# Patient Record
Sex: FEMALE | Race: WHITE | Employment: OTHER | URBAN - METROPOLITAN AREA
[De-identification: names, ages, dates, MRNs, and addresses within clinical notes are randomized per-mention and may not be internally consistent; named-entity substitution may affect disease eponyms.]

---

## 2017-04-07 ENCOUNTER — OFFICE VISIT (OUTPATIENT)
Dept: OBGYN CLINIC | Facility: CLINIC | Age: 52
End: 2017-04-07

## 2017-04-07 VITALS
DIASTOLIC BLOOD PRESSURE: 80 MMHG | WEIGHT: 150 LBS | HEIGHT: 64.25 IN | SYSTOLIC BLOOD PRESSURE: 129 MMHG | BODY MASS INDEX: 25.61 KG/M2 | HEART RATE: 72 BPM

## 2017-04-07 DIAGNOSIS — Z12.4 SCREENING FOR MALIGNANT NEOPLASM OF CERVIX: ICD-10-CM

## 2017-04-07 DIAGNOSIS — Z01.419 ENCOUNTER FOR GYNECOLOGICAL EXAMINATION: Primary | ICD-10-CM

## 2017-04-07 DIAGNOSIS — N93.0 PCB (POST COITAL BLEEDING): ICD-10-CM

## 2017-04-07 DIAGNOSIS — Z12.31 ENCOUNTER FOR SCREENING MAMMOGRAM FOR BREAST CANCER: ICD-10-CM

## 2017-04-07 PROCEDURE — 99396 PREV VISIT EST AGE 40-64: CPT | Performed by: OBSTETRICS & GYNECOLOGY

## 2017-04-07 RX ORDER — PREDNISONE 20 MG/1
TABLET ORAL
Refills: 0 | COMMUNITY
Start: 2017-02-12 | End: 2017-04-07

## 2017-04-07 RX ORDER — FLUTICASONE PROPIONATE AND SALMETEROL 50; 250 UG/1; UG/1
POWDER RESPIRATORY (INHALATION)
Refills: 10 | COMMUNITY
Start: 2017-02-10 | End: 2017-10-04

## 2017-04-07 NOTE — PROGRESS NOTES
Julien Rubinstein is a 50year old female [id number] Patient's last menstrual period was 09/15/2014. here for annual exam.       Last seen 9/1/15. Last pap 4/2014 normal with neg HPV. LMP 2014. No changes with health. Mild hot flashes.     Had on HOURS AS NEEDED FOR WHEEZING AND BEFORE EXERCISE ACTIVITIES Disp: 3 Inhaler Rfl: 0   Multiple Vitamin (MULTIVITAMINS OR) Take  by mouth. Disp:  Rfl:    BIOTIN OR Take  by mouth.  Disp:  Rfl:    Calcium Carb-Cholecalciferol (CALCIUM 1000 + D OR) Take  by valentín tenderness  Vagina:  Normal appearance without lesions, no abnormal discharge  Cervix:  Normal without tenderness on motion  Uterus: normal in size, contour, position, mobility, without tenderness  Adnexa: normal without masses or tenderness  Perineum/anus

## 2017-04-17 NOTE — PROGRESS NOTES
Quick Note:    Normal pap and negative HPV.  But return to clinic in 1 year for annual.   Negative gc/chlamydia/trich  ______

## 2017-05-09 PROCEDURE — 82607 VITAMIN B-12: CPT | Performed by: INTERNAL MEDICINE

## 2017-05-09 PROCEDURE — 81001 URINALYSIS AUTO W/SCOPE: CPT | Performed by: INTERNAL MEDICINE

## 2017-05-09 PROCEDURE — 82746 ASSAY OF FOLIC ACID SERUM: CPT | Performed by: INTERNAL MEDICINE

## 2017-05-09 PROCEDURE — 84165 PROTEIN E-PHORESIS SERUM: CPT | Performed by: INTERNAL MEDICINE

## 2017-05-09 PROCEDURE — 83883 ASSAY NEPHELOMETRY NOT SPEC: CPT | Performed by: INTERNAL MEDICINE

## 2017-05-09 PROCEDURE — 86334 IMMUNOFIX E-PHORESIS SERUM: CPT | Performed by: INTERNAL MEDICINE

## 2017-05-15 PROCEDURE — 82085 ASSAY OF ALDOLASE: CPT | Performed by: INTERNAL MEDICINE

## 2017-05-30 PROCEDURE — 86618 LYME DISEASE ANTIBODY: CPT | Performed by: INTERNAL MEDICINE

## 2017-05-30 PROCEDURE — 84238 ASSAY NONENDOCRINE RECEPTOR: CPT | Performed by: INTERNAL MEDICINE

## 2017-05-30 PROCEDURE — 83519 RIA NONANTIBODY: CPT | Performed by: INTERNAL MEDICINE

## 2017-05-30 PROCEDURE — 36415 COLL VENOUS BLD VENIPUNCTURE: CPT | Performed by: INTERNAL MEDICINE

## 2017-07-06 PROCEDURE — 83516 IMMUNOASSAY NONANTIBODY: CPT | Performed by: OTHER

## 2017-07-06 PROCEDURE — 86235 NUCLEAR ANTIGEN ANTIBODY: CPT | Performed by: OTHER

## 2017-07-06 PROCEDURE — 36415 COLL VENOUS BLD VENIPUNCTURE: CPT | Performed by: OTHER

## 2017-07-08 PROCEDURE — 84156 ASSAY OF PROTEIN URINE: CPT | Performed by: OTHER

## 2017-07-08 PROCEDURE — 83883 ASSAY NEPHELOMETRY NOT SPEC: CPT | Performed by: OTHER

## 2017-07-08 PROCEDURE — 36415 COLL VENOUS BLD VENIPUNCTURE: CPT | Performed by: OTHER

## 2017-07-08 PROCEDURE — 86335 IMMUNFIX E-PHORSIS/URINE/CSF: CPT | Performed by: OTHER

## 2017-08-03 PROCEDURE — 86658 ENTEROVIRUS ANTIBODY: CPT | Performed by: INTERNAL MEDICINE

## 2017-08-03 PROCEDURE — 86788 WEST NILE VIRUS AB IGM: CPT | Performed by: INTERNAL MEDICINE

## 2017-08-03 PROCEDURE — 82784 ASSAY IGA/IGD/IGG/IGM EACH: CPT | Performed by: INTERNAL MEDICINE

## 2017-08-03 PROCEDURE — 86664 EPSTEIN-BARR NUCLEAR ANTIGEN: CPT | Performed by: INTERNAL MEDICINE

## 2017-08-03 PROCEDURE — 86665 EPSTEIN-BARR CAPSID VCA: CPT | Performed by: INTERNAL MEDICINE

## 2017-08-03 PROCEDURE — 86645 CMV ANTIBODY IGM: CPT | Performed by: INTERNAL MEDICINE

## 2017-08-03 PROCEDURE — 86789 WEST NILE VIRUS ANTIBODY: CPT | Performed by: INTERNAL MEDICINE

## 2017-08-03 PROCEDURE — 86644 CMV ANTIBODY: CPT | Performed by: INTERNAL MEDICINE

## 2017-08-03 PROCEDURE — 86663 EPSTEIN-BARR ANTIBODY: CPT | Performed by: INTERNAL MEDICINE

## 2017-08-03 PROCEDURE — 36415 COLL VENOUS BLD VENIPUNCTURE: CPT | Performed by: INTERNAL MEDICINE

## 2017-08-09 PROCEDURE — 86803 HEPATITIS C AB TEST: CPT | Performed by: INTERNAL MEDICINE

## 2017-08-09 PROCEDURE — 87389 HIV-1 AG W/HIV-1&-2 AB AG IA: CPT | Performed by: INTERNAL MEDICINE

## 2017-08-09 PROCEDURE — 36415 COLL VENOUS BLD VENIPUNCTURE: CPT | Performed by: INTERNAL MEDICINE

## 2017-08-09 PROCEDURE — 82595 ASSAY OF CRYOGLOBULIN: CPT | Performed by: INTERNAL MEDICINE

## 2017-08-09 PROCEDURE — 82787 IGG 1 2 3 OR 4 EACH: CPT | Performed by: INTERNAL MEDICINE

## 2017-08-17 PROCEDURE — 86666 EHRLICHIA ANTIBODY: CPT | Performed by: INTERNAL MEDICINE

## 2017-08-17 PROCEDURE — 87799 DETECT AGENT NOS DNA QUANT: CPT | Performed by: INTERNAL MEDICINE

## 2017-08-17 PROCEDURE — 36415 COLL VENOUS BLD VENIPUNCTURE: CPT | Performed by: INTERNAL MEDICINE

## 2017-08-17 PROCEDURE — 86611 BARTONELLA ANTIBODY: CPT | Performed by: INTERNAL MEDICINE

## 2017-08-17 PROCEDURE — 86753 PROTOZOA ANTIBODY NOS: CPT | Performed by: INTERNAL MEDICINE

## 2017-08-17 PROCEDURE — 86812 HLA TYPING A B OR C: CPT | Performed by: INTERNAL MEDICINE

## 2018-02-12 ENCOUNTER — TELEPHONE (OUTPATIENT)
Dept: PEDIATRICS CLINIC | Facility: CLINIC | Age: 53
End: 2018-02-12

## 2018-02-12 NOTE — TELEPHONE ENCOUNTER
Pt informed she still has an active mammo order in her chart from 4/7/17 that she never had done. Gave her # to call and schedule this. Pt aware it is only valid through 4/7/18.

## 2018-03-04 ENCOUNTER — HOSPITAL ENCOUNTER (OUTPATIENT)
Dept: MAMMOGRAPHY | Facility: HOSPITAL | Age: 53
Discharge: HOME | End: 2018-03-04
Attending: OBSTETRICS & GYNECOLOGY

## 2018-03-04 DIAGNOSIS — Z12.31 ENCOUNTER FOR SCREENING MAMMOGRAM FOR BREAST CANCER: ICD-10-CM

## 2018-03-04 PROCEDURE — 77067 SCR MAMMO BI INCL CAD: CPT | Performed by: OBSTETRICS & GYNECOLOGY

## 2018-03-23 PROCEDURE — 81001 URINALYSIS AUTO W/SCOPE: CPT | Performed by: INTERNAL MEDICINE

## 2018-03-23 PROCEDURE — 87641 MR-STAPH DNA AMP PROBE: CPT | Performed by: INTERNAL MEDICINE

## 2018-03-23 PROCEDURE — 87086 URINE CULTURE/COLONY COUNT: CPT | Performed by: INTERNAL MEDICINE

## 2018-03-29 ENCOUNTER — APPOINTMENT (OUTPATIENT)
Dept: GENERAL RADIOLOGY | Facility: HOSPITAL | Age: 53
End: 2018-03-29
Attending: ORTHOPAEDIC SURGERY
Payer: COMMERCIAL

## 2018-03-29 ENCOUNTER — ANESTHESIA EVENT (OUTPATIENT)
Dept: SURGERY | Facility: HOSPITAL | Age: 53
End: 2018-03-29
Payer: COMMERCIAL

## 2018-03-29 ENCOUNTER — SURGERY (OUTPATIENT)
Age: 53
End: 2018-03-29

## 2018-03-29 ENCOUNTER — ANESTHESIA (OUTPATIENT)
Dept: SURGERY | Facility: HOSPITAL | Age: 53
End: 2018-03-29
Payer: COMMERCIAL

## 2018-03-29 ENCOUNTER — HOSPITAL ENCOUNTER (OUTPATIENT)
Facility: HOSPITAL | Age: 53
Setting detail: HOSPITAL OUTPATIENT SURGERY
Discharge: HOME | End: 2018-03-29
Attending: ORTHOPAEDIC SURGERY | Admitting: ORTHOPAEDIC SURGERY
Payer: COMMERCIAL

## 2018-03-29 VITALS
DIASTOLIC BLOOD PRESSURE: 69 MMHG | HEART RATE: 76 BPM | OXYGEN SATURATION: 98 % | RESPIRATION RATE: 14 BRPM | BODY MASS INDEX: 24.84 KG/M2 | TEMPERATURE: 98 F | WEIGHT: 145.5 LBS | HEIGHT: 64 IN | SYSTOLIC BLOOD PRESSURE: 135 MMHG

## 2018-03-29 DIAGNOSIS — M54.59 INTRACTABLE LOW BACK PAIN: ICD-10-CM

## 2018-03-29 DIAGNOSIS — M51.16 LUMBAR DISC HERNIATION WITH RADICULOPATHY: ICD-10-CM

## 2018-03-29 DIAGNOSIS — Z98.890 S/P LUMBAR MICRODISCECTOMY: Primary | ICD-10-CM

## 2018-03-29 PROCEDURE — 72100 X-RAY EXAM L-S SPINE 2/3 VWS: CPT | Performed by: ORTHOPAEDIC SURGERY

## 2018-03-29 PROCEDURE — 88311 DECALCIFY TISSUE: CPT | Performed by: ORTHOPAEDIC SURGERY

## 2018-03-29 PROCEDURE — 0SB20ZZ EXCISION OF LUMBAR VERTEBRAL DISC, OPEN APPROACH: ICD-10-PCS | Performed by: ORTHOPAEDIC SURGERY

## 2018-03-29 PROCEDURE — 76001 XR C-ARM FLUORO >1 HOUR  (CPT=76001): CPT | Performed by: ORTHOPAEDIC SURGERY

## 2018-03-29 PROCEDURE — 01NB0ZZ RELEASE LUMBAR NERVE, OPEN APPROACH: ICD-10-PCS | Performed by: ORTHOPAEDIC SURGERY

## 2018-03-29 PROCEDURE — 88304 TISSUE EXAM BY PATHOLOGIST: CPT | Performed by: ORTHOPAEDIC SURGERY

## 2018-03-29 RX ORDER — HYDROMORPHONE HYDROCHLORIDE 1 MG/ML
0.2 INJECTION, SOLUTION INTRAMUSCULAR; INTRAVENOUS; SUBCUTANEOUS EVERY 5 MIN PRN
Status: DISCONTINUED | OUTPATIENT
Start: 2018-03-29 | End: 2018-03-29

## 2018-03-29 RX ORDER — HYDROMORPHONE HYDROCHLORIDE 1 MG/ML
0.6 INJECTION, SOLUTION INTRAMUSCULAR; INTRAVENOUS; SUBCUTANEOUS EVERY 5 MIN PRN
Status: DISCONTINUED | OUTPATIENT
Start: 2018-03-29 | End: 2018-03-29

## 2018-03-29 RX ORDER — ROCURONIUM BROMIDE 10 MG/ML
INJECTION, SOLUTION INTRAVENOUS AS NEEDED
Status: DISCONTINUED | OUTPATIENT
Start: 2018-03-29 | End: 2018-03-29 | Stop reason: SURG

## 2018-03-29 RX ORDER — DOCUSATE SODIUM 100 MG/1
100 CAPSULE, LIQUID FILLED ORAL 2 TIMES DAILY
Qty: 60 CAPSULE | Refills: 1 | Status: SHIPPED | OUTPATIENT
Start: 2018-03-29 | End: 2018-04-06 | Stop reason: ALTCHOICE

## 2018-03-29 RX ORDER — FAMOTIDINE 20 MG/1
20 TABLET ORAL ONCE
Status: DISCONTINUED | OUTPATIENT
Start: 2018-03-29 | End: 2018-03-29 | Stop reason: HOSPADM

## 2018-03-29 RX ORDER — HYDROCODONE BITARTRATE AND ACETAMINOPHEN 5; 325 MG/1; MG/1
2 TABLET ORAL AS NEEDED
Status: COMPLETED | OUTPATIENT
Start: 2018-03-29 | End: 2018-03-29

## 2018-03-29 RX ORDER — SCOLOPAMINE TRANSDERMAL SYSTEM 1 MG/1
1 PATCH, EXTENDED RELEASE TRANSDERMAL
Status: DISCONTINUED | OUTPATIENT
Start: 2018-03-29 | End: 2018-04-01 | Stop reason: HOSPADM

## 2018-03-29 RX ORDER — GLYCOPYRROLATE 0.2 MG/ML
INJECTION INTRAMUSCULAR; INTRAVENOUS AS NEEDED
Status: DISCONTINUED | OUTPATIENT
Start: 2018-03-29 | End: 2018-03-29 | Stop reason: SURG

## 2018-03-29 RX ORDER — MORPHINE SULFATE 4 MG/ML
4 INJECTION, SOLUTION INTRAMUSCULAR; INTRAVENOUS EVERY 10 MIN PRN
Status: DISCONTINUED | OUTPATIENT
Start: 2018-03-29 | End: 2018-03-29

## 2018-03-29 RX ORDER — ACETAMINOPHEN 500 MG
1000 TABLET ORAL ONCE
Status: DISCONTINUED | OUTPATIENT
Start: 2018-03-29 | End: 2018-03-29 | Stop reason: HOSPADM

## 2018-03-29 RX ORDER — HYDROMORPHONE HYDROCHLORIDE 1 MG/ML
INJECTION, SOLUTION INTRAMUSCULAR; INTRAVENOUS; SUBCUTANEOUS AS NEEDED
Status: DISCONTINUED | OUTPATIENT
Start: 2018-03-29 | End: 2018-03-29 | Stop reason: SURG

## 2018-03-29 RX ORDER — SODIUM CHLORIDE, SODIUM LACTATE, POTASSIUM CHLORIDE, CALCIUM CHLORIDE 600; 310; 30; 20 MG/100ML; MG/100ML; MG/100ML; MG/100ML
INJECTION, SOLUTION INTRAVENOUS CONTINUOUS
Status: DISCONTINUED | OUTPATIENT
Start: 2018-03-29 | End: 2018-03-29

## 2018-03-29 RX ORDER — DEXAMETHASONE SODIUM PHOSPHATE 4 MG/ML
VIAL (ML) INJECTION AS NEEDED
Status: DISCONTINUED | OUTPATIENT
Start: 2018-03-29 | End: 2018-03-29 | Stop reason: SURG

## 2018-03-29 RX ORDER — NEOSTIGMINE METHYLSULFATE 0.5 MG/ML
INJECTION INTRAVENOUS AS NEEDED
Status: DISCONTINUED | OUTPATIENT
Start: 2018-03-29 | End: 2018-03-29 | Stop reason: SURG

## 2018-03-29 RX ORDER — HALOPERIDOL 5 MG/ML
0.25 INJECTION INTRAMUSCULAR ONCE AS NEEDED
Status: DISCONTINUED | OUTPATIENT
Start: 2018-03-29 | End: 2018-03-29

## 2018-03-29 RX ORDER — TIZANIDINE 4 MG/1
4 TABLET ORAL EVERY 8 HOURS PRN
Qty: 50 TABLET | Refills: 0 | Status: SHIPPED | OUTPATIENT
Start: 2018-03-29 | End: 2018-05-15

## 2018-03-29 RX ORDER — HYDROCODONE BITARTRATE AND ACETAMINOPHEN 5; 325 MG/1; MG/1
1 TABLET ORAL AS NEEDED
Status: COMPLETED | OUTPATIENT
Start: 2018-03-29 | End: 2018-03-29

## 2018-03-29 RX ORDER — ONDANSETRON 2 MG/ML
4 INJECTION INTRAMUSCULAR; INTRAVENOUS ONCE AS NEEDED
Status: DISCONTINUED | OUTPATIENT
Start: 2018-03-29 | End: 2018-03-29

## 2018-03-29 RX ORDER — NALOXONE HYDROCHLORIDE 0.4 MG/ML
80 INJECTION, SOLUTION INTRAMUSCULAR; INTRAVENOUS; SUBCUTANEOUS AS NEEDED
Status: DISCONTINUED | OUTPATIENT
Start: 2018-03-29 | End: 2018-03-29

## 2018-03-29 RX ORDER — SCOLOPAMINE TRANSDERMAL SYSTEM 1 MG/1
1 PATCH, EXTENDED RELEASE TRANSDERMAL ONCE
Status: DISCONTINUED | OUTPATIENT
Start: 2018-03-29 | End: 2018-03-29 | Stop reason: HOSPADM

## 2018-03-29 RX ORDER — MORPHINE SULFATE 10 MG/ML
6 INJECTION, SOLUTION INTRAMUSCULAR; INTRAVENOUS EVERY 10 MIN PRN
Status: DISCONTINUED | OUTPATIENT
Start: 2018-03-29 | End: 2018-03-29

## 2018-03-29 RX ORDER — LIDOCAINE HYDROCHLORIDE 10 MG/ML
INJECTION, SOLUTION EPIDURAL; INFILTRATION; INTRACAUDAL; PERINEURAL AS NEEDED
Status: DISCONTINUED | OUTPATIENT
Start: 2018-03-29 | End: 2018-03-29 | Stop reason: SURG

## 2018-03-29 RX ORDER — MAGNESIUM HYDROXIDE 1200 MG/15ML
LIQUID ORAL CONTINUOUS PRN
Status: DISCONTINUED | OUTPATIENT
Start: 2018-03-29 | End: 2018-03-29

## 2018-03-29 RX ORDER — OXYCODONE HYDROCHLORIDE AND ACETAMINOPHEN 5; 325 MG/1; MG/1
1 TABLET ORAL EVERY 4 HOURS PRN
Qty: 40 TABLET | Refills: 0 | Status: SHIPPED | OUTPATIENT
Start: 2018-03-29 | End: 2018-04-06 | Stop reason: ALTCHOICE

## 2018-03-29 RX ORDER — CEFAZOLIN SODIUM/WATER 2 G/20 ML
2 SYRINGE (ML) INTRAVENOUS ONCE
Status: COMPLETED | OUTPATIENT
Start: 2018-03-29 | End: 2018-03-29

## 2018-03-29 RX ORDER — MIDAZOLAM HYDROCHLORIDE 1 MG/ML
INJECTION INTRAMUSCULAR; INTRAVENOUS AS NEEDED
Status: DISCONTINUED | OUTPATIENT
Start: 2018-03-29 | End: 2018-03-29 | Stop reason: SURG

## 2018-03-29 RX ORDER — HYDROMORPHONE HYDROCHLORIDE 1 MG/ML
0.4 INJECTION, SOLUTION INTRAMUSCULAR; INTRAVENOUS; SUBCUTANEOUS EVERY 5 MIN PRN
Status: DISCONTINUED | OUTPATIENT
Start: 2018-03-29 | End: 2018-03-29

## 2018-03-29 RX ORDER — BUPIVACAINE HYDROCHLORIDE 2.5 MG/ML
INJECTION, SOLUTION EPIDURAL; INFILTRATION; INTRACAUDAL AS NEEDED
Status: DISCONTINUED | OUTPATIENT
Start: 2018-03-29 | End: 2018-03-29 | Stop reason: HOSPADM

## 2018-03-29 RX ORDER — MORPHINE SULFATE 2 MG/ML
2 INJECTION, SOLUTION INTRAMUSCULAR; INTRAVENOUS EVERY 10 MIN PRN
Status: DISCONTINUED | OUTPATIENT
Start: 2018-03-29 | End: 2018-03-29

## 2018-03-29 RX ORDER — METOCLOPRAMIDE 10 MG/1
10 TABLET ORAL ONCE
Status: DISCONTINUED | OUTPATIENT
Start: 2018-03-29 | End: 2018-03-29 | Stop reason: HOSPADM

## 2018-03-29 RX ORDER — ONDANSETRON 2 MG/ML
INJECTION INTRAMUSCULAR; INTRAVENOUS AS NEEDED
Status: DISCONTINUED | OUTPATIENT
Start: 2018-03-29 | End: 2018-03-29 | Stop reason: SURG

## 2018-03-29 RX ADMIN — HYDROMORPHONE HYDROCHLORIDE 0.5 MG: 1 INJECTION, SOLUTION INTRAMUSCULAR; INTRAVENOUS; SUBCUTANEOUS at 08:10:00

## 2018-03-29 RX ADMIN — HYDROMORPHONE HYDROCHLORIDE 0.5 MG: 1 INJECTION, SOLUTION INTRAMUSCULAR; INTRAVENOUS; SUBCUTANEOUS at 09:05:00

## 2018-03-29 RX ADMIN — GLYCOPYRROLATE 0.7 MG: 0.2 INJECTION INTRAMUSCULAR; INTRAVENOUS at 08:45:00

## 2018-03-29 RX ADMIN — ROCURONIUM BROMIDE 40 MG: 10 INJECTION, SOLUTION INTRAVENOUS at 07:40:00

## 2018-03-29 RX ADMIN — SODIUM CHLORIDE, SODIUM LACTATE, POTASSIUM CHLORIDE, CALCIUM CHLORIDE: 600; 310; 30; 20 INJECTION, SOLUTION INTRAVENOUS at 09:09:00

## 2018-03-29 RX ADMIN — DEXAMETHASONE SODIUM PHOSPHATE 8 MG: 4 MG/ML VIAL (ML) INJECTION at 08:05:00

## 2018-03-29 RX ADMIN — ONDANSETRON 4 MG: 2 INJECTION INTRAMUSCULAR; INTRAVENOUS at 08:45:00

## 2018-03-29 RX ADMIN — CEFAZOLIN SODIUM/WATER 2 G: 2 G/20 ML SYRINGE (ML) INTRAVENOUS at 07:35:00

## 2018-03-29 RX ADMIN — NEOSTIGMINE METHYLSULFATE 3.5 MG: 0.5 INJECTION INTRAVENOUS at 08:45:00

## 2018-03-29 RX ADMIN — MIDAZOLAM HYDROCHLORIDE 2 MG: 1 INJECTION INTRAMUSCULAR; INTRAVENOUS at 07:33:00

## 2018-03-29 RX ADMIN — LIDOCAINE HYDROCHLORIDE 50 MG: 10 INJECTION, SOLUTION EPIDURAL; INFILTRATION; INTRACAUDAL; PERINEURAL at 07:40:00

## 2018-03-29 NOTE — ANESTHESIA PREPROCEDURE EVALUATION
Anesthesia PreOp Note    HPI:     Xin Colbert is a 48year old female who presents for preoperative consultation requested by: Esther Hanna MD    Date of Surgery: 3/29/2018    Procedure(s):  LUMBAR PERCUTANEOUS DISCECTOMY 1 LEVEL  Indication: Leanne Hartmann PROAIR  (90 BASE) MCG/ACT Inhalation Aero Soln INHALE 2 PUFFS INTO THE LUNGS EVERY 6 HOURS AS NEEDED FOR WHEEZING AND BEFORE EXERCISE ACTIVITIES Disp: 3 Inhaler Rfl: 0 Past Week at Unknown time       Current Facility-Administered Medications Ordered HCT 43.5 03/23/2018   MCV 93.8 03/23/2018   MCH 29.5 03/23/2018   MCHC 31.5 03/23/2018   RDW 12.7 03/23/2018    03/23/2018       Lab Results  Component Value Date    03/23/2018   K 4.1 03/23/2018    03/23/2018   CO2 31.1 03/23/2018   BUN I have informed Jesse Villafana Magi  of the nature of the anesthetic plan, benefits, risks, major complications, and any alternative forms of anesthetic management. All of the patient's questions were answered to the best of my ability.  The patient jimi

## 2018-03-29 NOTE — PROGRESS NOTES
S: Patient seen in after surgery. She is doing well at this time. Denies leg pain, no numbness/tingling. She notes back pain well controlled at this time. Denies other complaints. Inspection:  Awake alert No acute distress.  No difficulty breathing

## 2018-03-29 NOTE — INTERVAL H&P NOTE
Pre-op Diagnosis: Lumbar disc herniation with radiculopathy [M51.16]    The above referenced H&P was reviewed by Kalie Roman PA-C on 3/29/2018, the patient was examined and no significant changes have occurred in the patient's condition since the H&P wa

## 2018-03-29 NOTE — ANESTHESIA POSTPROCEDURE EVALUATION
Patient: Gisele Sow    Procedure Summary     Date:  03/29/18 Room / Location:  [address] [place] [address] MAIN OR 09 / [address] [place] [address] MAIN OR    Anesthesia Start:  6642 Anesthesia Stop:      Procedure:  LUMBAR LAMINECTOMY 1 LEVEL (N/A ) Diagnosis:       Lumbar disc herniation wi

## 2018-03-29 NOTE — BRIEF OP NOTE
Pre-Operative Diagnosis: Lumbar disc herniation with radiculopathy [M51.16]     Post-Operative Diagnosis: Lumbar disc herniation with radiculopathy [M51.16]      Procedure Performed:   Procedure(s):  Left Lumbar 3 - Lumbar 4 Microdiscectomy    Surgeon(s) a

## 2018-03-29 NOTE — H&P
HISTORY OF CHIEF COMPLAINT:    Jazmyne Rodriguez is a 48year old female, who complains of low back pain with Left  leg pain which started several months ago.  Specific injury or accident: No.   She has been having severe leg pain for the past few weeks Years of education:                 Number of children:                Social History Main Topics    Smoking status: Former Smoker                                                                Packs/day: 0.50      Years: 15.00       Smokeless tobacco: N EYES: No blurred vision, double vision or changes in vision. HEENT: No swallowing or hearing difficulties. RESPIRATORY: No shortness of breath. CARDIOVASCULAR: No chest pain or palpitations.    GI: Denies nausea, vomiting, constipation, diarrhea; no rec See chart below:  Palpation of Lumbar Area Right   (POS or NEG) Left   (POS or NEG)   Lumbar paraspinals Neg Neg   SIJ Neg Neg   PSIS Neg Neg   Trochanteric Bursa Neg Neg      STRENGTH Right  /5 Left  /5   FHL 5 5   EHL 5 5   DF 5 5-   PF 5 5   KE 5 5-   K Patient has not improved with conservative treatment as detailed above, Surgical discussion was held regarding Left L3-4 microdiscectomy     Reviewed the risk and benefits of the surgery: Including bleeding, infection, injury to nerves, dura blood vessels

## 2018-03-29 NOTE — OPERATIVE REPORT
[facility] AND [facility]   PATIENT'S NAME:  Nevin MART   ATTENDING PHYSICIAN:  Kirit Guillen M.D. OPERATING PHYSICIAN:  Kirit Guillen M.D.     PATIENT CSN#: [id number]  MEDICAL RECORD #:  [id number]  YOB: 1965  ADMISSION DATE: 3/29/2018  John Nichole and used a Bovie cautery to expose down to the fascial layer on the left aspect of the lamina. Using the Bovie, we retracted the muscular layer off the lamina using a Cleary. We placed our retractor in position and confirmed level.  Used a cholo to thin the la assistance throughout the procedure, from positioning through wound closure.      Archie Shetty MD

## 2018-05-15 ENCOUNTER — OFFICE VISIT (OUTPATIENT)
Dept: OBGYN CLINIC | Facility: CLINIC | Age: 53
End: 2018-05-15

## 2018-05-15 VITALS
BODY MASS INDEX: 24.66 KG/M2 | SYSTOLIC BLOOD PRESSURE: 122 MMHG | DIASTOLIC BLOOD PRESSURE: 81 MMHG | WEIGHT: 148 LBS | HEIGHT: 65 IN | HEART RATE: 83 BPM

## 2018-05-15 DIAGNOSIS — Z01.419 ENCOUNTER FOR GYNECOLOGICAL EXAMINATION: Primary | ICD-10-CM

## 2018-05-15 PROCEDURE — 99396 PREV VISIT EST AGE 40-64: CPT | Performed by: OBSTETRICS & GYNECOLOGY

## 2018-05-15 NOTE — PROGRESS NOTES
Patrick Lema is a 48year old female [id number] Patient's last menstrual period was 09/15/2014. here for annual exam.       Last seen 4/7/17. Last pap 4/2017 normal with neg HPV. LMP 2014.       Had symptoms of Lymes disease but tested negative LIQUID AND USE 1 SPRAY IN EACH NOSTRIL TWICE DAILY Disp: 3 Bottle Rfl: 0   ADVAIR DISKUS 250-50 MCG/DOSE Inhalation Aerosol Powder, Breath Activated Inhale 1 puff into the lungs Q12H.  Disp: 60 each Rfl: 5   PROAIR  (90 BASE) MCG/ACT Inhalation Aero lesions and prolapse  Bladder:  No fullness, masses or tenderness  Vagina:  Normal appearance without lesions, no abnormal discharge  Cervix:  Normal without tenderness on motion  Uterus: normal in size, contour, position, mobility, without tenderness  Adn

## 2018-05-30 PROBLEM — M54.50 ACUTE MIDLINE LOW BACK PAIN WITHOUT SCIATICA: Status: ACTIVE | Noted: 2018-05-30

## 2018-05-30 PROBLEM — Z98.890 S/P LUMBAR MICRODISCECTOMY: Status: ACTIVE | Noted: 2018-05-30

## 2019-05-24 PROBLEM — M54.50 ACUTE MIDLINE LOW BACK PAIN WITHOUT SCIATICA: Status: RESOLVED | Noted: 2018-05-30 | Resolved: 2019-05-24

## 2019-06-19 ENCOUNTER — OFFICE VISIT (OUTPATIENT)
Dept: OBGYN CLINIC | Facility: CLINIC | Age: 54
End: 2019-06-19
Payer: COMMERCIAL

## 2019-06-19 VITALS
HEART RATE: 69 BPM | DIASTOLIC BLOOD PRESSURE: 84 MMHG | HEIGHT: 64 IN | BODY MASS INDEX: 25.57 KG/M2 | WEIGHT: 149.81 LBS | SYSTOLIC BLOOD PRESSURE: 125 MMHG

## 2019-06-19 DIAGNOSIS — Z01.419 ENCOUNTER FOR GYNECOLOGICAL EXAMINATION: Primary | ICD-10-CM

## 2019-06-19 DIAGNOSIS — Z12.31 ENCOUNTER FOR SCREENING MAMMOGRAM FOR BREAST CANCER: ICD-10-CM

## 2019-06-19 PROCEDURE — 99396 PREV VISIT EST AGE 40-64: CPT | Performed by: OBSTETRICS & GYNECOLOGY

## 2019-06-20 NOTE — PROGRESS NOTES
Walker Humphrey is a 48year old female [id number] Patient's last menstrual period was 09/15/2014. here for annual exam.       Last seen 5/15/18. Last pap 4/2017 normal with neg HPV. Doing well with [place] herbs. No changes with health.       O Powder, Breath Activated INHALE 1 PUFF INTO THE LUNGS EVERY 12 HOURS Disp: 60 each Rfl: 0   FLUTICASONE PROPIONATE 50 MCG/ACT Nasal Suspension SHAKE LIQUID AND USE 1 SPRAY IN EACH NOSTRIL TWICE DAILY Disp: 3 Bottle Rfl: 3   PROAIR  (90 BASE) MCG/ACT normal in size, location, without lesions and prolapse  Bladder:  No fullness, masses or tenderness  Vagina:  Normal appearance without lesions, no abnormal discharge  Cervix:  Normal without tenderness on motion  Uterus: normal in size, contour, position,

## 2019-07-17 ENCOUNTER — HOSPITAL ENCOUNTER (OUTPATIENT)
Dept: MAMMOGRAPHY | Facility: HOSPITAL | Age: 54
Discharge: HOME | End: 2019-07-17
Attending: OBSTETRICS & GYNECOLOGY
Payer: COMMERCIAL

## 2019-07-17 DIAGNOSIS — Z12.31 ENCOUNTER FOR SCREENING MAMMOGRAM FOR BREAST CANCER: ICD-10-CM

## 2019-07-17 PROCEDURE — 77067 SCR MAMMO BI INCL CAD: CPT | Performed by: OBSTETRICS & GYNECOLOGY

## 2019-07-17 PROCEDURE — 77063 BREAST TOMOSYNTHESIS BI: CPT | Performed by: OBSTETRICS & GYNECOLOGY

## 2020-07-02 ENCOUNTER — TELEPHONE (OUTPATIENT)
Dept: OBGYN CLINIC | Facility: CLINIC | Age: 55
End: 2020-07-02

## 2020-07-02 DIAGNOSIS — Z12.39 SCREENING FOR MALIGNANT NEOPLASM OF BREAST: Primary | ICD-10-CM

## 2020-07-02 NOTE — TELEPHONE ENCOUNTER
Pt sent a [employer] request requesting an order for her mammogram before her annual on 7/21 with Buddy Hamlin [phone].  Please advise

## 2020-07-20 ENCOUNTER — HOSPITAL ENCOUNTER (OUTPATIENT)
Dept: MAMMOGRAPHY | Facility: HOSPITAL | Age: 55
Discharge: HOME | End: 2020-07-20
Attending: OBSTETRICS & GYNECOLOGY
Payer: COMMERCIAL

## 2020-07-20 DIAGNOSIS — Z12.39 SCREENING FOR MALIGNANT NEOPLASM OF BREAST: ICD-10-CM

## 2020-07-20 PROCEDURE — 77067 SCR MAMMO BI INCL CAD: CPT | Performed by: OBSTETRICS & GYNECOLOGY

## 2020-07-20 PROCEDURE — 77063 BREAST TOMOSYNTHESIS BI: CPT | Performed by: OBSTETRICS & GYNECOLOGY

## 2020-07-21 ENCOUNTER — OFFICE VISIT (OUTPATIENT)
Dept: OBGYN CLINIC | Facility: CLINIC | Age: 55
End: 2020-07-21
Payer: COMMERCIAL

## 2020-07-21 VITALS
SYSTOLIC BLOOD PRESSURE: 127 MMHG | BODY MASS INDEX: 27 KG/M2 | DIASTOLIC BLOOD PRESSURE: 82 MMHG | HEART RATE: 72 BPM | WEIGHT: 155.38 LBS

## 2020-07-21 DIAGNOSIS — Z01.419 ENCOUNTER FOR GYNECOLOGICAL EXAMINATION: Primary | ICD-10-CM

## 2020-07-21 PROCEDURE — 3074F SYST BP LT 130 MM HG: CPT | Performed by: OBSTETRICS & GYNECOLOGY

## 2020-07-21 PROCEDURE — 3079F DIAST BP 80-89 MM HG: CPT | Performed by: OBSTETRICS & GYNECOLOGY

## 2020-07-21 PROCEDURE — 99396 PREV VISIT EST AGE 40-64: CPT | Performed by: OBSTETRICS & GYNECOLOGY

## 2020-07-22 LAB — HPV I/H RISK 1 DNA SPEC QL NAA+PROBE: NEGATIVE

## 2020-07-22 NOTE — PROGRESS NOTES
Eleanor Barry is a 50year old female [id number] Patient's last menstrual period was 09/15/2014. here for annual exam.       Last seen 6/19/19. Last pap 4/2017 normal with neg HPV. Had mammogram yesterday    No longer doing [employer] herbs.   Do MEDICATIONS:  Current Outpatient Medications   Medication Sig Dispense Refill   • ADVAIR DISKUS 250-50 MCG/DOSE Inhalation Aerosol Powder, Breath Activated INHALE 1 PUFF BY MOUTH EVERY 12 HOURS 60 each 4   • Albuterol Sulfate HFA (PROAIR HFA) 108 (90 Genitalia: normal appearance, hair distribution, and no lesions  Urethral Meatus:  normal in size, location, without lesions and prolapse  Bladder:  No fullness, masses or tenderness  Vagina:  Normal appearance without lesions, no abnormal discharge  [place]

## 2021-04-09 DIAGNOSIS — Z23 NEED FOR VACCINATION: ICD-10-CM

## 2021-06-13 PROBLEM — R73.03 PREDIABETES: Status: ACTIVE | Noted: 2021-06-13

## 2021-07-28 ENCOUNTER — OFFICE VISIT (OUTPATIENT)
Dept: OBGYN CLINIC | Facility: CLINIC | Age: 56
End: 2021-07-28
Payer: COMMERCIAL

## 2021-07-28 VITALS
HEART RATE: 69 BPM | BODY MASS INDEX: 26.15 KG/M2 | SYSTOLIC BLOOD PRESSURE: 126 MMHG | HEIGHT: 64 IN | WEIGHT: 153.19 LBS | DIASTOLIC BLOOD PRESSURE: 84 MMHG

## 2021-07-28 DIAGNOSIS — Z01.419 ENCOUNTER FOR GYNECOLOGICAL EXAMINATION: Primary | ICD-10-CM

## 2021-07-28 DIAGNOSIS — Z12.31 ENCOUNTER FOR SCREENING MAMMOGRAM FOR BREAST CANCER: ICD-10-CM

## 2021-07-28 PROCEDURE — 3074F SYST BP LT 130 MM HG: CPT | Performed by: OBSTETRICS & GYNECOLOGY

## 2021-07-28 PROCEDURE — 99396 PREV VISIT EST AGE 40-64: CPT | Performed by: OBSTETRICS & GYNECOLOGY

## 2021-07-28 PROCEDURE — 3079F DIAST BP 80-89 MM HG: CPT | Performed by: OBSTETRICS & GYNECOLOGY

## 2021-07-28 PROCEDURE — 3008F BODY MASS INDEX DOCD: CPT | Performed by: OBSTETRICS & GYNECOLOGY

## 2021-07-28 NOTE — PROGRESS NOTES
Jairo Feng is a 49year old female [id number] Patient's last menstrual period was 09/15/2014. here for annual exam.       Last seen 7/21/2020. Last pap 7/2020 normal with neg HPV. No changes with health.       OBSTETRICS HISTORY:  OB Histor Cholecalciferol (VITAMIN D3) 25 MCG (1000 UT) Oral Cap Take 1 tablet by mouth daily. • Psyllium (METAMUCIL OR)      • Multiple Vitamins-Minerals (AIRBORNE OR) Take by mouth.      • ROSUVASTATIN CALCIUM 10 MG Oral Tab TAKE 1 TABLET BY MOUTH NIGHTLY 90 ta and situation.  Appropriate mood and affect    Pelvic Exam:  External Genitalia: normal appearance, hair distribution, and no lesions  Urethral Meatus:  normal in size, location, without lesions and prolapse  Bladder:  No fullness, masses or tenderness  Vag

## 2021-08-24 ENCOUNTER — HOSPITAL ENCOUNTER (OUTPATIENT)
Dept: MAMMOGRAPHY | Facility: HOSPITAL | Age: 56
Discharge: HOME | End: 2021-08-24
Attending: OBSTETRICS & GYNECOLOGY
Payer: COMMERCIAL

## 2021-08-24 DIAGNOSIS — Z12.31 ENCOUNTER FOR SCREENING MAMMOGRAM FOR BREAST CANCER: ICD-10-CM

## 2021-08-24 PROCEDURE — 77063 BREAST TOMOSYNTHESIS BI: CPT | Performed by: OBSTETRICS & GYNECOLOGY

## 2021-08-24 PROCEDURE — 77067 SCR MAMMO BI INCL CAD: CPT | Performed by: OBSTETRICS & GYNECOLOGY

## 2022-08-03 ENCOUNTER — OFFICE VISIT (OUTPATIENT)
Dept: OBGYN CLINIC | Facility: CLINIC | Age: 57
End: 2022-08-03
Payer: COMMERCIAL

## 2022-08-03 VITALS
HEART RATE: 64 BPM | SYSTOLIC BLOOD PRESSURE: 119 MMHG | BODY MASS INDEX: 26.06 KG/M2 | WEIGHT: 152.63 LBS | HEIGHT: 64 IN | DIASTOLIC BLOOD PRESSURE: 79 MMHG

## 2022-08-03 DIAGNOSIS — Z01.419 WELL WOMAN EXAM WITH ROUTINE GYNECOLOGICAL EXAM: Primary | ICD-10-CM

## 2022-08-03 DIAGNOSIS — Z12.31 SCREENING MAMMOGRAM FOR BREAST CANCER: ICD-10-CM

## 2022-08-03 DIAGNOSIS — Z78.0 POSTMENOPAUSAL: ICD-10-CM

## 2022-08-25 ENCOUNTER — HOSPITAL ENCOUNTER (OUTPATIENT)
Dept: MAMMOGRAPHY | Facility: HOSPITAL | Age: 57
Discharge: HOME | End: 2022-08-25
Attending: NURSE PRACTITIONER
Payer: COMMERCIAL

## 2022-08-25 DIAGNOSIS — Z12.31 SCREENING MAMMOGRAM FOR BREAST CANCER: ICD-10-CM

## 2022-08-25 PROCEDURE — 77063 BREAST TOMOSYNTHESIS BI: CPT | Performed by: NURSE PRACTITIONER

## 2022-08-25 PROCEDURE — 77067 SCR MAMMO BI INCL CAD: CPT | Performed by: NURSE PRACTITIONER

## 2023-08-03 ENCOUNTER — OFFICE VISIT (OUTPATIENT)
Dept: OBGYN CLINIC | Facility: CLINIC | Age: 58
End: 2023-08-03

## 2023-08-03 VITALS
BODY MASS INDEX: 27 KG/M2 | DIASTOLIC BLOOD PRESSURE: 86 MMHG | HEART RATE: 70 BPM | WEIGHT: 156.38 LBS | SYSTOLIC BLOOD PRESSURE: 139 MMHG

## 2023-08-03 DIAGNOSIS — Z12.4 SCREENING FOR CERVICAL CANCER: ICD-10-CM

## 2023-08-03 DIAGNOSIS — Z12.31 ENCOUNTER FOR SCREENING MAMMOGRAM FOR BREAST CANCER: ICD-10-CM

## 2023-08-03 DIAGNOSIS — Z01.419 ENCOUNTER FOR GYNECOLOGICAL EXAMINATION: Primary | ICD-10-CM

## 2023-08-03 PROCEDURE — 99396 PREV VISIT EST AGE 40-64: CPT | Performed by: OBSTETRICS & GYNECOLOGY

## 2023-08-03 PROCEDURE — 3079F DIAST BP 80-89 MM HG: CPT | Performed by: OBSTETRICS & GYNECOLOGY

## 2023-08-03 PROCEDURE — 3075F SYST BP GE 130 - 139MM HG: CPT | Performed by: OBSTETRICS & GYNECOLOGY

## 2023-08-03 RX ORDER — AZELASTINE 1 MG/ML
2 SPRAY, METERED NASAL 2 TIMES DAILY
COMMUNITY
Start: 2023-07-11

## 2023-08-03 RX ORDER — TIOTROPIUM BROMIDE 18 UG/1
18 CAPSULE ORAL; RESPIRATORY (INHALATION) DAILY
Qty: 30 CAPSULE | Refills: 6 | COMMUNITY
Start: 2023-07-20 | End: 2024-02-15

## 2023-08-03 RX ORDER — ESTROGEN,CON/M-PROGEST ACET 0.45-1.5MG
1 TABLET ORAL DAILY
Qty: 90 TABLET | Refills: 3 | Status: SHIPPED | OUTPATIENT
Start: 2023-08-03

## 2023-08-03 RX ORDER — MELOXICAM 15 MG/1
15 TABLET ORAL DAILY
COMMUNITY
Start: 2023-07-11

## 2023-08-04 LAB — HPV I/H RISK 1 DNA SPEC QL NAA+PROBE: NEGATIVE

## 2023-11-22 ENCOUNTER — HOSPITAL ENCOUNTER (OUTPATIENT)
Dept: MAMMOGRAPHY | Facility: HOSPITAL | Age: 58
Discharge: HOME | End: 2023-11-22
Attending: OBSTETRICS & GYNECOLOGY
Payer: COMMERCIAL

## 2023-11-22 DIAGNOSIS — Z12.31 ENCOUNTER FOR SCREENING MAMMOGRAM FOR BREAST CANCER: ICD-10-CM

## 2023-11-22 PROCEDURE — 77063 BREAST TOMOSYNTHESIS BI: CPT | Performed by: OBSTETRICS & GYNECOLOGY

## 2023-11-22 PROCEDURE — 77067 SCR MAMMO BI INCL CAD: CPT | Performed by: OBSTETRICS & GYNECOLOGY

## 2024-08-07 ENCOUNTER — OFFICE VISIT (OUTPATIENT)
Dept: OBGYN CLINIC | Facility: CLINIC | Age: 59
End: 2024-08-07
Payer: COMMERCIAL

## 2024-08-07 VITALS
HEIGHT: 64 IN | BODY MASS INDEX: 27.21 KG/M2 | HEART RATE: 67 BPM | DIASTOLIC BLOOD PRESSURE: 86 MMHG | WEIGHT: 159.38 LBS | SYSTOLIC BLOOD PRESSURE: 135 MMHG

## 2024-08-07 DIAGNOSIS — Z01.419 ENCOUNTER FOR GYNECOLOGICAL EXAMINATION: Primary | ICD-10-CM

## 2024-08-07 DIAGNOSIS — N76.6 VULVAR ULCERATION: ICD-10-CM

## 2024-08-07 DIAGNOSIS — N95.0 POSTMENOPAUSAL BLEEDING: ICD-10-CM

## 2024-08-07 DIAGNOSIS — Z12.31 ENCOUNTER FOR SCREENING MAMMOGRAM FOR BREAST CANCER: ICD-10-CM

## 2024-08-07 PROCEDURE — 99396 PREV VISIT EST AGE 40-64: CPT | Performed by: OBSTETRICS & GYNECOLOGY

## 2024-08-07 PROCEDURE — 3075F SYST BP GE 130 - 139MM HG: CPT | Performed by: OBSTETRICS & GYNECOLOGY

## 2024-08-07 PROCEDURE — 3079F DIAST BP 80-89 MM HG: CPT | Performed by: OBSTETRICS & GYNECOLOGY

## 2024-08-07 PROCEDURE — 3008F BODY MASS INDEX DOCD: CPT | Performed by: OBSTETRICS & GYNECOLOGY

## 2024-08-08 NOTE — PROGRESS NOTES
Tammy Sow is a 59 year old female  Patient's last menstrual period was 09/15/2014. here for annual exam.       Last seen 2023.  Last pap 2023 normal with negative HPV  Last mammogram 2023    Noticed a bump a week ago.    Also having brown discharge x 1 week      OBSTETRICS HISTORY:  OB History    Para Term  AB Living   2 2 2 0 0 2   SAB IAB Ectopic Multiple Live Births   0 0 0 0 2       GYNE HISTORY   Hx Prior Abnormal Pap: No  Pap Date: 23  Pap Result Notes: NEG HPV NEG  Follow Up Recommendation: MAMMO 23 JORDAN NEG ASSES.    MEDICAL HISTORY:  Past Medical History:    Acute midline low back pain without sciatica    ALLERGIC RHINITIS    Asthma (HCC)    allergic induced    Back problem    Heart murmur    High cholesterol     Past Surgical History:   Procedure Laterality Date    Colonoscopy      Colonoscopy N/A 2021    Procedure: COLONOSCOPY,  with polypectomy;  Surgeon: Champ Vásquez MD;  Location: [facility]    Colonoscopy,biopsy  2011    Performed by CHAMP VÁSQUEZ at [facility]    Colonoscopy,remv lesn,snare N/A 2016    diverticulosis. hp polyp    D & c      Dilation/curettage,diagnostic      Hysteroscopy      D&C    Other surgical history  2018    L2-L3 bulging disc    Tonsillectomy         SOCIAL HISTORY:  Social History     Socioeconomic History    Marital status:    Tobacco Use    Smoking status: Former     Current packs/day: 0.50     Average packs/day: 0.5 packs/day for 15.0 years (7.5 ttl pk-yrs)     Types: Cigarettes    Smokeless tobacco: Never    Tobacco comments:     Quit 30 years ago   Substance and Sexual Activity    Alcohol use: Yes     Comment: Social    Drug use: No    Sexual activity: Yes     Birth control/protection: Vasectomy   Other Topics Concern    Caffeine Concern Yes     Comment: coffee, 1-2 cups daily       FAMILY HISTORY:  Family History   Problem Relation Age of Onset     Cancer Father 69        colon ca with liver and lung mets; lung-smoker    Heart Disorder Father         IHSS    Other (Other) Daughter         nephrogenic DI    Diabetes Mother         adult onset    Heart Attack Paternal Grandfather         myocardial infarction    Breast Cancer Neg     Ovarian Cancer Neg        MEDICATIONS:  Current Outpatient Medications   Medication Sig Dispense Refill    azelastine 0.1 % Nasal Solution 2 sprays by Nasal route 2 (two) times daily.      Meloxicam 15 MG Oral Tab Take 1 tablet (15 mg total) by mouth daily.      Conj Estrog-Medroxyprogest Ace (PREMPRO) 0.45-1.5 MG Oral Tab Take 1 tablet by mouth daily. 90 tablet 3    rosuvastatin 10 MG Oral Tab Take 1 tablet (10 mg total) by mouth nightly. 90 tablet 1    ADVAIR DISKUS 250-50 MCG/DOSE Inhalation Aerosol Powder, Breath Activated INHALE 1 PUFF INTO THE LUNGS EVERY 12 HOURS 180 each 3    fluticasone propionate 50 MCG/ACT Nasal Suspension 1 spray by Nasal route 2 (two) times daily. 48 g 0    DULoxetine 30 MG Oral Cap DR Particles Take 1 capsule (30 mg total) by mouth daily. 30 capsule 5    tretinoin 0.025 % External Cream Apply a pea-sized amount to the entire face every other night for 2 wks then every night thereafter 45 g 3    Calcium Carbonate (CALCIUM 600 OR) Take by mouth.      Cholecalciferol (VITAMIN D3) 25 MCG (1000 UT) Oral Cap Take 1 tablet by mouth daily.      Psyllium (METAMUCIL OR)       Multiple Vitamins-Minerals (AIRBORNE OR) Take by mouth.      Albuterol Sulfate HFA (PROAIR HFA) 108 (90 Base) MCG/ACT Inhalation Aero Soln Inhale 2 puffs into the lungs every 6 (six) hours as needed for Wheezing. 3 Inhaler 1    LORazepam 2 MG Oral Tab Take 1 tab prior to MRI, may repeat in 1 hour if not effective (Patient not taking: Reported on 8/3/2023) 2 tablet 0       ALLERGIES:    Allergies   Allergen Reactions    Dust SHORTNESS OF BREATH         Depression Screening (PHQ-2/PHQ-9): Over the LAST 2 WEEKS   Little interest or pleasure in  doing things: Not at all    Feeling down, depressed, or hopeless: Not at all    PHQ-2 SCORE: 0           Review of Systems:  Constitutional:  Denies fatigue, night sweats, hot flashes  Eyes:  denies blurred or double vision  Cardiovascular:  denies chest pain or palpitations  Respiratory:  denies shortness of breath  Gastrointestinal:  denies heartburn, abdominal pain, diarrhea or constipation  Genitourinary:  denies dysuria, incontinence, abnormal vaginal discharge, vaginal itching  Musculoskeletal:  denies back pain.  Skin/Breast:  Denies any breast pain, lumps, or discharge.   Neurological:  denies headaches, extremity weakness or numbness.  Psychiatric: denies depression or anxiety.  Endocrine:   denies excessive thirst or urination.  Heme/Lymph:  easy bruising or bleeding.    PHYSICAL EXAM:   /86   Pulse 67   Ht 5' 4\" (1.626 m)   Wt 159 lb 6.4 oz (72.3 kg)   LMP 09/15/2014   BMI 27.36 kg/m²   Wt Readings from Last 2 Encounters:   08/07/24 159 lb 6.4 oz (72.3 kg)   08/03/23 156 lb 6.4 oz (70.9 kg)     Body mass index is 27.36 kg/m².    Constitutional: well developed, well nourished  Neck/Thyroid: thyroid symmetric, no nodules  Heart:  Regular rate and rhythm  Lungs:  Clear to asculation  Breast: normal without palpable masses, tenderness, asymmetry, nipple discharge, nipple retraction or skin changes  Abdomen:  soft, nontender, nondistended, no masses  Psychiatric:  Oriented to time, place, person and situation. Appropriate mood and affect    Pelvic Exam:  External Genitalia: normal appearance, hair distribution, and no lesions.  Cluster of tiny ulcerations on upper right labia by clitoral manriquez  Urethral Meatus:  normal in size, location, without lesions and prolapse  Bladder:  No fullness, masses or tenderness  Vagina:  Normal appearance without lesions, no abnormal discharge  Cervix:  Normal without tenderness on motion  Uterus: normal in size, contour, position, mobility, without  tenderness  Adnexa: normal without masses or tenderness        Assessment & Plan:    Tammy Sow is a 59 year old female who presents for an annual physical exam.    1. Encounter for gynecological examination  Pap not done.  ASCCP guidelines reviewed.   Encouraged annual exam.   Order for mammogram to be done 11/2024.   RTC 1 year or prn     2. Encounter for screening mammogram for breast cancer  - [facility] DALE 2D+3D SCREENING BILAT (CPT=77067/32079); Future    3. Postmenopausal bleeding  Order for pelvic ultrasound to look at endometrial stripe.  E-bx if thickened stripe or recurrent bleeding.    4. Vulvar ulceration  - HSV 1/2 Subtype by PCR (Lesion-Only)        Requested Prescriptions      No prescriptions requested or ordered in this encounter         Lorie Wilburn MD  8/8/2024  2:46 PM

## 2024-08-13 ENCOUNTER — TELEPHONE (OUTPATIENT)
Dept: OBGYN CLINIC | Facility: CLINIC | Age: 59
End: 2024-08-13

## 2024-08-13 LAB
HSV 1 NAA: NEGATIVE
HSV 1 NAA: NEGATIVE
HSV 2 NAA: POSITIVE
HSV 2 NAA: POSITIVE

## 2024-08-14 RX ORDER — VALACYCLOVIR HYDROCHLORIDE 1 G/1
1000 TABLET, FILM COATED ORAL DAILY
Qty: 7 TABLET | Refills: 0 | Status: SHIPPED | OUTPATIENT
Start: 2024-08-14 | End: 2024-08-21

## 2024-08-14 NOTE — TELEPHONE ENCOUNTER
Patient aware of results, all questions answered. Valtrex 1 gram x 7days sent to pharmacy patient requested.

## 2024-08-23 ENCOUNTER — HOSPITAL ENCOUNTER (OUTPATIENT)
Dept: ULTRASOUND IMAGING | Facility: HOSPITAL | Age: 59
Discharge: HOME | End: 2024-08-23
Attending: OBSTETRICS & GYNECOLOGY
Payer: COMMERCIAL

## 2024-08-23 DIAGNOSIS — N95.0 POSTMENOPAUSAL BLEEDING: ICD-10-CM

## 2024-08-23 PROCEDURE — 76856 US EXAM PELVIC COMPLETE: CPT | Performed by: OBSTETRICS & GYNECOLOGY

## 2024-08-23 PROCEDURE — 76830 TRANSVAGINAL US NON-OB: CPT | Performed by: OBSTETRICS & GYNECOLOGY

## 2024-09-13 ENCOUNTER — TELEPHONE (OUTPATIENT)
Dept: OBGYN CLINIC | Facility: CLINIC | Age: 59
End: 2024-09-13

## 2024-09-13 NOTE — TELEPHONE ENCOUNTER
Patient was diagnosed with herpes, type 2 from culture done on 8/7/2024.  Valtrex 1gm, by mouth for 7 days was sent to the pharmacy on 8/14.  Patient states she took full course of medication.  Patient states she is experiencing \"tingling down there\".  Patient denies any sores or bumps, but states it doesn't feel normal down there.  Message to Dr. Wilburn for any recommendations.

## 2024-09-14 RX ORDER — VALACYCLOVIR HYDROCHLORIDE 500 MG/1
500 TABLET, FILM COATED ORAL 2 TIMES DAILY
Qty: 6 TABLET | Refills: 3 | Status: SHIPPED | OUTPATIENT
Start: 2024-09-14

## 2024-11-27 ENCOUNTER — HOSPITAL ENCOUNTER (OUTPATIENT)
Dept: MAMMOGRAPHY | Facility: HOSPITAL | Age: 59
Discharge: HOME | End: 2024-11-27
Attending: OBSTETRICS & GYNECOLOGY
Payer: COMMERCIAL

## 2024-11-27 DIAGNOSIS — Z12.31 ENCOUNTER FOR SCREENING MAMMOGRAM FOR BREAST CANCER: ICD-10-CM

## 2024-11-27 PROCEDURE — 77067 SCR MAMMO BI INCL CAD: CPT | Performed by: OBSTETRICS & GYNECOLOGY

## 2024-11-27 PROCEDURE — 77063 BREAST TOMOSYNTHESIS BI: CPT | Performed by: OBSTETRICS & GYNECOLOGY

## 2024-12-30 ENCOUNTER — TELEPHONE (OUTPATIENT)
Dept: OBGYN CLINIC | Facility: CLINIC | Age: 59
End: 2024-12-30

## 2024-12-30 RX ORDER — VALACYCLOVIR HYDROCHLORIDE 500 MG/1
500 TABLET, FILM COATED ORAL 2 TIMES DAILY
Qty: 6 TABLET | Refills: 0 | Status: SHIPPED | OUTPATIENT
Start: 2024-12-31

## 2024-12-30 RX ORDER — VALACYCLOVIR HYDROCHLORIDE 500 MG/1
500 TABLET, FILM COATED ORAL DAILY
Qty: 30 TABLET | Refills: 9 | Status: SHIPPED | OUTPATIENT
Start: 2025-01-03

## 2024-12-30 NOTE — TELEPHONE ENCOUNTER
Patient called in to request a refill on Valacyclovir 500 mg.  Patient request the refill be sent to the pharmacy in [place]

## 2024-12-30 NOTE — TELEPHONE ENCOUNTER
Ok for refill.  If pt wants suppression, then after she finishes 3 day course, then valtrex 500 mg q day refill til next annual

## 2024-12-30 NOTE — TELEPHONE ENCOUNTER
Patient is currently in [place].  States she is having a herpes outbreak.  Dr. Wilburn sent in an prescription on 9/14/2024 for #6 with 3 refills.  Patient states she does not remember how many outbreaks she has had but she feels like her symptoms never go away.  Patient has used all of the refills.  Message to Dr. Villeda on call.  Ok for refill of valtrex?    Patient aware prescription can be sent to [facility] and transferred to [place].    Message also sent to Dr. Wilburn for possible suppression as patient has used 4 courses of valtrex since 9/2024.

## 2024-12-31 NOTE — TELEPHONE ENCOUNTER
Noted. Patient informed of Dr. Wilburn's recommendations. Patient verbalized understanding and expressed desires for suppression. Prescription ordered per Dr. Wilburn and sent to the [facility] pharmacy in [place]. Patient advised to contact [facility] and ask them to transfer prescription to  [facility] in [place]. Patient agreed. Advised patient to call back if she has any further questions or concerns. Patient verbalized understanding.

## 2025-08-13 ENCOUNTER — OFFICE VISIT (OUTPATIENT)
Dept: OBGYN CLINIC | Facility: CLINIC | Age: 60
End: 2025-08-13

## 2025-08-13 VITALS
HEART RATE: 85 BPM | DIASTOLIC BLOOD PRESSURE: 81 MMHG | WEIGHT: 149 LBS | BODY MASS INDEX: 25.44 KG/M2 | HEIGHT: 64 IN | SYSTOLIC BLOOD PRESSURE: 123 MMHG

## 2025-08-13 DIAGNOSIS — Z12.31 ENCOUNTER FOR SCREENING MAMMOGRAM FOR BREAST CANCER: ICD-10-CM

## 2025-08-13 DIAGNOSIS — Z01.419 ENCOUNTER FOR GYNECOLOGICAL EXAMINATION: Primary | ICD-10-CM

## 2025-08-13 PROCEDURE — 99396 PREV VISIT EST AGE 40-64: CPT | Performed by: OBSTETRICS & GYNECOLOGY

## 2025-08-13 PROCEDURE — 3074F SYST BP LT 130 MM HG: CPT | Performed by: OBSTETRICS & GYNECOLOGY

## 2025-08-13 PROCEDURE — 3079F DIAST BP 80-89 MM HG: CPT | Performed by: OBSTETRICS & GYNECOLOGY

## 2025-08-13 PROCEDURE — 3008F BODY MASS INDEX DOCD: CPT | Performed by: OBSTETRICS & GYNECOLOGY

## 2025-08-13 RX ORDER — VALACYCLOVIR HYDROCHLORIDE 500 MG/1
500 TABLET, FILM COATED ORAL DAILY
Qty: 90 TABLET | Refills: 3 | Status: SHIPPED | OUTPATIENT
Start: 2025-08-13

## (undated) DEVICE — DRAPE SHEET LG

## (undated) DEVICE — 3M™ IOBAN™ 2 ANTIMICROBIAL INCISE DRAPE 6650EZ: Brand: IOBAN™ 2

## (undated) DEVICE — LAMINECTOMY: Brand: MEDLINE INDUSTRIES, INC.

## (undated) DEVICE — PROXIMATE SKIN STAPLERS (35 WIDE) CONTAINS 35 STAINLESS STEEL STAPLES (FIXED HEAD): Brand: PROXIMATE

## (undated) DEVICE — SOL  .9 1000ML BTL

## (undated) DEVICE — SUTURE PDS II 0 CT-1

## (undated) DEVICE — GLOVE SRG BIOGEL 8.5

## (undated) DEVICE — CUSHION INSERT PRONEVIEW LG

## (undated) DEVICE — SUTURE VICRYL 2-0 CT-2

## (undated) DEVICE — SURGIFOAM

## (undated) DEVICE — STERILE POLYISOPRENE POWDER-FREE SURGICAL GLOVES: Brand: PROTEXIS

## (undated) DEVICE — INSULATED BLADE ELECTRODE 6.5

## (undated) DEVICE — SURGIFLO

## (undated) DEVICE — DRESSING AQUACEL AG 3.5X3.75

## (undated) DEVICE — STERILE LATEX POWDER-FREE SURGICAL GLOVESWITH NITRILE COATING: Brand: PROTEXIS

## (undated) DEVICE — COVER PSTN BW FRM SKN CR KT

## (undated) DEVICE — GAUZE SPONGES,12 PLY: Brand: CURITY

## (undated) DEVICE — 3.0MM PRECISION NEURO (MATCH HEAD)

## (undated) DEVICE — SUTURE VICRYL 0 CT-1

## (undated) DEVICE — SUTURE MONOCRYL 3-0 Y936H

## (undated) DEVICE — CAUTERY BLADE 2IN INS E1455

## (undated) DEVICE — DRAPE SRG 150X54IN LEICA

## (undated) DEVICE — 3M(TM) TEGADERM(TM) TRANSPARENT FILM DRESSING FRAME STYLE 1628: Brand: 3M™ TEGADERM™

## (undated) NOTE — MR AVS SNAPSHOT
[facility]  [address]  [phone]               Thank you for choosing us for your health care visit with Kiara Tamayo MD.  We are glad to serve you and happy to provide you with this summary of CALCIUM 1000 + D OR   Take  by mouth. Fluticasone Propionate 50 MCG/ACT Susp   INHALE 1 SPRAY INTO EACH NOSTRIL TWICE DAILY   Commonly known as:  FLONASE           MULTIVITAMINS OR   Take  by mouth.            PROAIR  (90 Base) MCG/ACT Aer Tips for increasing your physical activity – Adults who are physically active are less likely to develop some chronic diseases than adults who are inactive.      HOW TO GET STARTED: HOW TO STAY MOTIVATED:   Start activities slowly and build up over time Do